# Patient Record
Sex: FEMALE | Race: BLACK OR AFRICAN AMERICAN | NOT HISPANIC OR LATINO | Employment: PART TIME | URBAN - METROPOLITAN AREA
[De-identification: names, ages, dates, MRNs, and addresses within clinical notes are randomized per-mention and may not be internally consistent; named-entity substitution may affect disease eponyms.]

---

## 2019-12-03 ENCOUNTER — HOSPITAL ENCOUNTER (EMERGENCY)
Facility: HOSPITAL | Age: 44
Discharge: HOME | End: 2019-12-03
Attending: EMERGENCY MEDICINE | Admitting: EMERGENCY MEDICINE
Payer: COMMERCIAL

## 2019-12-03 VITALS
SYSTOLIC BLOOD PRESSURE: 139 MMHG | DIASTOLIC BLOOD PRESSURE: 64 MMHG | HEART RATE: 89 BPM | RESPIRATION RATE: 17 BRPM | OXYGEN SATURATION: 100 % | TEMPERATURE: 97.5 F

## 2019-12-03 DIAGNOSIS — F32.A DEPRESSION: ICD-10-CM

## 2019-12-03 DIAGNOSIS — F41.9 ANXIETY: Primary | ICD-10-CM

## 2019-12-03 PROCEDURE — 99284 EMERGENCY DEPT VISIT MOD MDM: CPT | Performed by: EMERGENCY MEDICINE

## 2019-12-03 PROCEDURE — 99284 EMERGENCY DEPT VISIT MOD MDM: CPT

## 2019-12-03 RX ORDER — ALPRAZOLAM 0.25 MG/1
TABLET ORAL
Qty: 10 TABLET | Refills: 0 | Status: SHIPPED | OUTPATIENT
Start: 2019-12-03

## 2019-12-03 NOTE — ED PROVIDER NOTES
History  Chief Complaint   Patient presents with    Anxiety     pt states to feel down and out, c/o feeling anxious and "cant seem to come down from it" pt states to "have issues with significant other at home, causing it to be worse" pt denies SI/HI  pt does not currently take any medications for anxiety or depression  pt states "i have been drinking hot tea, taking hot baths to try to help"     HPI  58-year-old [place] American female with a chief complaint of feeling very anxious and does know how to deal with it any more  Patient states that she feels that she can no longer control it and tries to take a walk or go out in the yard and watch her kids play, drink hot tea, or even taking hot baths  Patient denies any prior history of depression or mental health problems  Patient does state that she has a sister who approximately 8 years ago was diagnosed with schizophrenia and refuses help and is now in a shelter  Patient works part-time as a  and has 3 young boys at home ages 10, 5 and Meekie  Patient has 2 older children out of the house - ages 23 and 32  Patient states approximately 2 years ago her  had an affair and she caught him cheating and their relationship has been on the rocks since that time  Patient states that he works in [place] and only comes home  and weekends but they are in separate rooms  Patient states that she has never gone to counseling or they have not gone to marital counseling together  Patient states  she confides a lot in her mother who is very supportive but lives in [place]  Patient states they moved to the [place] [address] approximately 2 years ago and she does not have any family here  Patient denies any suicidal or homicidal thoughts  None       History reviewed  No pertinent past medical history  Past Surgical History:   Procedure Laterality Date    ANKLE SURGERY Left      SECTION         History reviewed   No pertinent family history  I have reviewed and agree with the history as documented  Social History     Tobacco Use    Smoking status: Never Smoker    Smokeless tobacco: Never Used   Substance Use Topics    Alcohol use: Yes     Comment: occasional    Drug use: Never        Review of Systems   Constitutional: Negative for diaphoresis, fatigue and fever  HENT: Negative for congestion, ear pain, nosebleeds and sore throat  Eyes: Negative for photophobia, pain, discharge and visual disturbance  Respiratory: Negative for cough, choking, chest tightness, shortness of breath and wheezing  Cardiovascular: Negative for chest pain and palpitations  Gastrointestinal: Negative for abdominal distention, abdominal pain, diarrhea and vomiting  Genitourinary: Negative for dysuria, flank pain and frequency  Musculoskeletal: Negative for back pain, gait problem and joint swelling  Skin: Negative for color change and rash  Neurological: Negative for dizziness, syncope and headaches  Psychiatric/Behavioral: Positive for dysphoric mood  Negative for behavioral problems, confusion and suicidal ideas  The patient is nervous/anxious  All other systems reviewed and are negative  Physical Exam  Physical Exam   Constitutional: She is oriented to person, place, and time  She appears well-developed and well-nourished  HENT:   Head: Normocephalic and atraumatic  Mouth/Throat: Oropharynx is clear and moist    Eyes: Pupils are equal, round, and reactive to light  EOM are normal    Neck: Normal range of motion  Neck supple  Cardiovascular: Normal rate, regular rhythm and normal heart sounds  Pulmonary/Chest: Effort normal and breath sounds normal  No stridor  No respiratory distress  She has no wheezes  She has no rales  Abdominal: Soft  Bowel sounds are normal  There is no tenderness  There is no rebound and no guarding  Musculoskeletal: Normal range of motion     Neurological: She is alert and oriented to person, place, and time  No cranial nerve deficit  She exhibits normal muscle tone  Coordination normal    Skin: Skin is warm and dry  Psychiatric:   Tearful, anxious, and somewhat depressed  Patient denies any suicidal or homicidal thoughts  Patient has good eye contact and is able to smile and laugh at things  Patient does breakdown crying a few times however when talking about the sadness in her marriage or the estrangement  from her sister  Nursing note and vitals reviewed  Vital Signs  ED Triage Vitals [12/03/19 1143]   Temperature Pulse Respirations Blood Pressure SpO2   97 5 °F (36 4 °C) 89 17 139/64 100 %      Temp Source Heart Rate Source Patient Position - Orthostatic VS BP Location FiO2 (%)   Oral Monitor Sitting Left arm --      Pain Score       --           Vitals:    12/03/19 1143   BP: 139/64   Pulse: 89   Patient Position - Orthostatic VS: Sitting         Visual Acuity      ED Medications  Medications - No data to display    Diagnostic Studies  Results Reviewed     None                 No orders to display              Procedures  Procedures         ED Course      patient and I had a good long talk and she appreciated my help  I also consulted Ml Adan from crisis and she gave patient a packet of referrals  I encouraged patient to follow up with counselor as soon as possible and I also gave her a prescription for some Xanax but only 10 pills and warned her that they are very addicting and to only take them when she feels at her "wits end"  I also advised patient not to drive with this medication  patient states that she felt much better after she left here and everyone was so nice  MDM     Differential diagnosis includes:  1  Anxiety  2  Acute anxiety attack  3  Depression  4  Non homicidal depression  5   Nonsuicidal depression           Disposition  Final diagnoses:   Anxiety   Depression     Time reflects when diagnosis was documented in both MDM as applicable and the Disposition within this note     Time User Action Codes Description Comment    12/3/2019 12:53 PM Keny Vegas Add [F41 9] Anxiety     12/3/2019 12:53 PM Keny Vegas Add [F32 9] Depression       ED Disposition     ED Disposition Condition Date/Time Comment    Discharge Stable [place] Dec 3, 2019 12:53 PM Bartolome Tovar discharge to home/self care  Follow-up Information     Follow up With Specialties Details Why Contact Info    Counselor as referred to  In 1 week            Discharge Medication List as of 12/3/2019 12:55 PM      START taking these medications    Details   ALPRAZolam (XANAX) 0 25 mg tablet Take 1 tablet daily p r n  Anxiety, Print           No discharge procedures on file      ED Provider  Electronically Signed by           Layla Ibrahim DO  12/03/19 0902

## 2019-12-03 NOTE — ED NOTES
Patient provided outpt [facility] resources, per the request of the ED physician       Jaron Polanco LMSW  12/03/19  1276

## 2020-04-19 ENCOUNTER — HOSPITAL ENCOUNTER (EMERGENCY)
Facility: HOSPITAL | Age: 45
Discharge: HOME | End: 2020-04-19
Attending: EMERGENCY MEDICINE | Admitting: EMERGENCY MEDICINE
Payer: COMMERCIAL

## 2020-04-19 ENCOUNTER — APPOINTMENT (EMERGENCY)
Dept: RADIOLOGY | Facility: HOSPITAL | Age: 45
End: 2020-04-19
Payer: COMMERCIAL

## 2020-04-19 VITALS
TEMPERATURE: 97.5 F | DIASTOLIC BLOOD PRESSURE: 71 MMHG | HEART RATE: 93 BPM | RESPIRATION RATE: 19 BRPM | SYSTOLIC BLOOD PRESSURE: 146 MMHG | OXYGEN SATURATION: 98 %

## 2020-04-19 DIAGNOSIS — M25.532 LEFT WRIST PAIN: Primary | ICD-10-CM

## 2020-04-19 PROCEDURE — 99283 EMERGENCY DEPT VISIT LOW MDM: CPT

## 2020-04-19 PROCEDURE — 99282 EMERGENCY DEPT VISIT SF MDM: CPT | Performed by: EMERGENCY MEDICINE

## 2020-04-19 PROCEDURE — 73110 X-RAY EXAM OF WRIST: CPT

## 2020-04-19 PROCEDURE — 29125 APPL SHORT ARM SPLINT STATIC: CPT | Performed by: EMERGENCY MEDICINE

## 2020-04-19 RX ORDER — HYDROCODONE BITARTRATE AND ACETAMINOPHEN 5; 325 MG/1; MG/1
1 TABLET ORAL EVERY 6 HOURS PRN
Qty: 10 TABLET | Refills: 0 | Status: SHIPPED | OUTPATIENT
Start: 2020-04-19

## 2020-04-23 ENCOUNTER — OFFICE VISIT (OUTPATIENT)
Dept: OBGYN CLINIC | Facility: CLINIC | Age: 45
End: 2020-04-23

## 2020-04-23 VITALS
BODY MASS INDEX: 33.79 KG/M2 | WEIGHT: 236 LBS | SYSTOLIC BLOOD PRESSURE: 116 MMHG | HEIGHT: 70 IN | HEART RATE: 92 BPM | DIASTOLIC BLOOD PRESSURE: 79 MMHG

## 2020-04-23 DIAGNOSIS — M25.532 LEFT WRIST PAIN: Primary | ICD-10-CM

## 2020-04-23 PROCEDURE — 20605 DRAIN/INJ JOINT/BURSA W/O US: CPT | Performed by: ORTHOPAEDIC SURGERY

## 2020-04-23 PROCEDURE — 99203 OFFICE O/P NEW LOW 30 MIN: CPT | Performed by: ORTHOPAEDIC SURGERY

## 2020-04-23 RX ADMIN — LIDOCAINE HYDROCHLORIDE 2.5 ML: 10 INJECTION, SOLUTION INFILTRATION; PERINEURAL at 09:07

## 2020-04-23 RX ADMIN — Medication 0.5 MEQ: at 09:07

## 2020-04-23 RX ADMIN — TRIAMCINOLONE ACETONIDE 20 MG: 40 INJECTION, SUSPENSION INTRA-ARTICULAR; INTRAMUSCULAR at 09:07

## 2020-04-24 RX ORDER — LIDOCAINE HYDROCHLORIDE 10 MG/ML
2.5 INJECTION, SOLUTION INFILTRATION; PERINEURAL
Status: COMPLETED | OUTPATIENT
Start: 2020-04-23 | End: 2020-04-23

## 2020-04-24 RX ORDER — TRIAMCINOLONE ACETONIDE 40 MG/ML
20 INJECTION, SUSPENSION INTRA-ARTICULAR; INTRAMUSCULAR
Status: COMPLETED | OUTPATIENT
Start: 2020-04-23 | End: 2020-04-23

## 2021-02-03 ENCOUNTER — HOSPITAL ENCOUNTER (EMERGENCY)
Facility: HOSPITAL | Age: 46
Discharge: HOME | End: 2021-02-03
Attending: EMERGENCY MEDICINE | Admitting: EMERGENCY MEDICINE
Payer: COMMERCIAL

## 2021-02-03 VITALS
BODY MASS INDEX: 33.79 KG/M2 | TEMPERATURE: 98.4 F | RESPIRATION RATE: 17 BRPM | OXYGEN SATURATION: 100 % | HEIGHT: 70 IN | WEIGHT: 236 LBS | HEART RATE: 104 BPM | DIASTOLIC BLOOD PRESSURE: 97 MMHG | SYSTOLIC BLOOD PRESSURE: 150 MMHG

## 2021-02-03 DIAGNOSIS — N64.4 BREAST PAIN, LEFT: Primary | ICD-10-CM

## 2021-02-03 PROCEDURE — 99283 EMERGENCY DEPT VISIT LOW MDM: CPT

## 2021-02-03 PROCEDURE — 99284 EMERGENCY DEPT VISIT MOD MDM: CPT | Performed by: PHYSICIAN ASSISTANT

## 2021-02-03 NOTE — ED PROVIDER NOTES
History  Chief Complaint   Patient presents with    Breast Pain     Pt reports pain in her left breast  Denies any lump, but states she finished her period yesterday, but she has tenderness in breast itself  50year old female with no significant past medical history presents to ED with chief complaint of vague pain in left breast   Onset reports as 2-3 days ago  Location of symptoms reported as the left breast   Quality is reported as vague aching pain in left breast   Severity is reported as mild  Associated symptoms:  Denies any rash or skin changes to left breast, denies chest pain or sob  Denies cough  Denies fevers  Denies night sweats or weight loss  Denies lymph node swelling  Modifiers: patient reports that she just finished her period yesterday and thinks this may be contributing to symptoms  Context: patient also reports she recently moved and did a lot of lifting of boxes  Denies any recent fall or injury  Reports no past personal history of family history of breast cancer  No exogenous estrogen use  Patient is a non smoker  Patient reports that she is extremely anxious regarding this pain in her breast, largely concerned that is is breast cancer and because she has small children at home  She states she recently moved to [place] and has not been able to set up PCP yet  Old charts reviewed  Patient last seen in Ed on 4/19/20 for evaluation of wrist pain  History provided by:  Patient   used: No        Prior to Admission Medications   Prescriptions Last Dose Informant Patient Reported? Taking? ALPRAZolam (XANAX) 0 25 mg tablet  Self No No   Sig: Take 1 tablet daily p r n   Anxiety   Patient not taking: Reported on 4/23/2020   HYDROcodone-acetaminophen (NORCO) 5-325 mg per tablet  Self No No   Sig: Take 1 tablet by mouth every 6 (six) hours as needed for pain for up to 10 dosesMax Daily Amount: 4 tablets   Patient not taking: Reported on 2/5/2021 Facility-Administered Medications: None       History reviewed  No pertinent past medical history  Past Surgical History:   Procedure Laterality Date    ANKLE SURGERY Left      SECTION         Family History   Problem Relation Age of Onset    Diabetes Father     No Known Problems Mother     No Known Problems Sister     No Known Problems Brother     No Known Problems Sister      I have reviewed and agree with the history as documented  E-Cigarette/Vaping    E-Cigarette Use Never User      E-Cigarette/Vaping Substances    Nicotine No     THC No     CBD No     Flavoring No     Other No     Unknown No      Social History     Tobacco Use    Smoking status: Never Smoker    Smokeless tobacco: Never Used   Substance Use Topics    Alcohol use: Yes     Comment: occasional    Drug use: Never       Review of Systems   Constitutional: Negative for activity change, appetite change, chills, diaphoresis, fatigue, fever and unexpected weight change  HENT: Negative for congestion, dental problem, drooling, ear discharge, ear pain, facial swelling, hearing loss, mouth sores, nosebleeds, postnasal drip, rhinorrhea, sinus pressure, sinus pain, sneezing, sore throat, tinnitus, trouble swallowing and voice change  Eyes: Negative for photophobia, pain, discharge, redness, itching and visual disturbance  Respiratory: Negative for apnea, cough, choking, chest tightness, shortness of breath, wheezing and stridor  Cardiovascular: Negative for chest pain, palpitations and leg swelling  Gastrointestinal: Negative for abdominal distention, abdominal pain, anal bleeding, blood in stool, constipation, diarrhea, nausea, rectal pain and vomiting  Endocrine: Negative for cold intolerance, heat intolerance, polydipsia, polyphagia and polyuria     Genitourinary: Negative for decreased urine volume, difficulty urinating, dyspareunia, dysuria, enuresis, flank pain, frequency, hematuria, menstrual problem, pelvic pain, urgency, vaginal bleeding, vaginal discharge and vaginal pain  Musculoskeletal: Negative for arthralgias, back pain, gait problem, joint swelling, myalgias, neck pain and neck stiffness  Skin: Negative for color change, pallor, rash and wound  Allergic/Immunologic: Negative for environmental allergies, food allergies and immunocompromised state  Neurological: Negative for dizziness, tremors, seizures, syncope, facial asymmetry, speech difficulty, weakness, light-headedness, numbness and headaches  Hematological: Negative for adenopathy  Does not bruise/bleed easily  Psychiatric/Behavioral: Negative for agitation, confusion, hallucinations, self-injury and suicidal ideas  The patient is not hyperactive  All other systems reviewed and are negative  Physical Exam  Physical Exam  Vitals signs and nursing note reviewed  Constitutional:       General: She is not in acute distress  Appearance: Normal appearance  She is well-developed  She is not diaphoretic  Comments: /97 (BP Location: Left arm)   Pulse 104   Temp 98 4 °F (36 9 °C) (Oral)   Resp 17   Ht 5' 10" (1 778 m)   Wt 107 kg (236 lb)   SpO2 100%   BMI 33 86 kg/m²      HENT:      Head: Normocephalic and atraumatic  Right Ear: External ear normal       Left Ear: External ear normal       Nose: Nose normal  No congestion or rhinorrhea  Mouth/Throat:      Mouth: Mucous membranes are moist       Pharynx: No oropharyngeal exudate or posterior oropharyngeal erythema  Eyes:      General: No scleral icterus  Right eye: No discharge  Left eye: No discharge  Conjunctiva/sclera: Conjunctivae normal       Pupils: Pupils are equal, round, and reactive to light  Neck:      Musculoskeletal: Normal range of motion and neck supple  No neck rigidity or muscular tenderness  Thyroid: No thyromegaly  Vascular: No JVD  Trachea: No tracheal deviation     Cardiovascular:      Rate and Rhythm: Normal rate and regular rhythm  Pulses: Normal pulses  Pulmonary:      Effort: Pulmonary effort is normal  No respiratory distress  Breath sounds: Normal breath sounds  No stridor  No wheezing, rhonchi or rales  Comments: Breast exam:  Left breast   Exam chaperoned by A  QUEENIE Aguayo  Exam performed by A  QUEENIE Aguayo  Normal inspection left breast   No skin thickening, erythema, or inflammatory changes present  No nipple inversion or discharge  Fibrocystic changes noted on palpation through entire breast  Mild area of fullness, poorly localized, just inferior to areola on left  Fibrous changes noted in this area  No well formed or identified masses  No left axillary lymphadenopathy or tenderness  Chest:      Chest wall: No tenderness  Abdominal:      General: Bowel sounds are normal  There is no distension  Palpations: Abdomen is soft  There is no mass  Tenderness: There is no abdominal tenderness  There is no right CVA tenderness, left CVA tenderness, guarding or rebound  Hernia: No hernia is present  Musculoskeletal: Normal range of motion  General: No swelling, tenderness, deformity or signs of injury  Right lower leg: No edema  Left lower leg: No edema  Lymphadenopathy:      Cervical: No cervical adenopathy  Skin:     General: Skin is dry  Capillary Refill: Capillary refill takes less than 2 seconds  Coloration: Skin is not jaundiced or pale  Findings: No bruising, erythema, lesion or rash  Neurological:      General: No focal deficit present  Mental Status: She is alert and oriented to person, place, and time  Mental status is at baseline  Cranial Nerves: No cranial nerve deficit  Sensory: No sensory deficit  Motor: No weakness or abnormal muscle tone        Coordination: Coordination normal       Gait: Gait normal       Deep Tendon Reflexes: Reflexes normal    Psychiatric:         Mood and Affect: Mood normal          Behavior: Behavior normal          Thought Content: Thought content normal          Judgment: Judgment normal          Vital Signs  ED Triage Vitals [02/03/21 0938]   Temperature Pulse Respirations Blood Pressure SpO2   98 4 °F (36 9 °C) 104 17 150/97 100 %      Temp Source Heart Rate Source Patient Position - Orthostatic VS BP Location FiO2 (%)   Oral Monitor Sitting Left arm --      Pain Score       6           Vitals:    02/03/21 0938   BP: 150/97   Pulse: 104   Patient Position - Orthostatic VS: Sitting         Visual Acuity      ED Medications  Medications - No data to display    Diagnostic Studies  Results Reviewed     None                 No orders to display              Procedures  Procedures         ED Course                             SBIRT 22yo+      Most Recent Value   SBIRT (24 yo +)   In order to provide better care to our patients, we are screening all of our patients for alcohol and drug use  Would it be okay to ask you these screening questions? Yes Filed at: 02/03/2021 1116   Initial Alcohol Screen: US AUDIT-C    1  How often do you have a drink containing alcohol?  0 Filed at: 02/03/2021 1116   2  How many drinks containing alcohol do you have on a typical day you are drinking? 0 Filed at: 02/03/2021 1116   3a  Male UNDER 65: How often do you have five or more drinks on one occasion? 0 Filed at: 02/03/2021 1116   3b  FEMALE Any Age, or MALE 65+: How often do you have 4 or more drinks on one occassion? 0 Filed at: 02/03/2021 1116   Audit-C Score  0 Filed at: 02/03/2021 1116   ANNELISE: How many times in the past year have you    Used an illegal drug or used a prescription medication for non-medical reasons? Never Filed at: 02/03/2021 1116                    MDM  Number of Diagnoses or Management Options  Breast pain, left:   Diagnosis management comments: ddx includes but is not limited to breast cancer, fibrocystic breast disease, breast mass, mastitis          Amount and/or Complexity of Data Reviewed  Review and summarize past medical records: yes    Risk of Complications, Morbidity, and/or Mortality  General comments: Long discussion with patient  Explained to patient that while exam appears most consistent with fibrocystic breast disease, physical exam cannot replaced mammography for further evaluation of breast mass or pain and evaluation for possible breast cancer  No overlying skin changes of concern and no evidence of mastitis  Discussed with patient gold standard testing for her concern of breath cancer is mammography  Instructed patient to follow up with ob/gyn or breast surgeon as soon as possible for further evaluation and mammography  Discussed she should not be falsely reassured by her physical exam today she needs definitive imaging for complete evaluation of breast mass  Patient verbalized that she will absolutely follow up to get mammogram   Outpatient resources given  Instructed to follow up with pcp and ob/gyn for further evaluation of her symptoms  Discussed may use APAP or NSAIDs OTC for treatment of pain  Entire exam and encounter chaperoned by A  QUEENIE Aguayo  Reviewed reasons to return to ed  Patient verbalized understanding of diagnosis and agreement with discharge plan of care as well as understanding of reasons to return to ed        Patient was seen during the outbreak of the corona virus epidemic   Resources are limited due to the severity of patient illnesses associated with virus   Testing is also limited at this time   Discussed with patient at the time of this evaluation   Due to the fact that limited resources are available -treatment options are limited        Patient Progress  Patient progress: stable      Disposition  Final diagnoses:   Breast pain, left     Time reflects when diagnosis was documented in both MDM as applicable and the Disposition within this note     Time User Action Codes Description Comment    2/3/2021 10:48 AM Sonny Hsieh Add [N64 4] Breast pain, left       ED Disposition     ED Disposition Condition Date/Time Comment    Discharge Stable Wed Feb 3, 2021 11:07 AM Bartolome Tovar discharge to home/self care  Follow-up Information     Follow up With Specialties Details Why Contact Info Additional [address] [facility] Emergency Department Emergency Medicine Go to  If symptoms worsen [address] [id number] [facility] Emergency Department, [place], [place], [address]    Josette Murcia MD Oncology, Surgical Oncology, Breast Surgery Call in 1 day for further evaluation of symptoms [address] [place] [address]  [phone]       Suzy Romo MD Family Medicine Call in 2 days for further evaluation of symptoms [address] [facility],[address]  [place] [address]  [phone]             Discharge Medication List as of 2/3/2021 11:07 AM      CONTINUE these medications which have NOT CHANGED    Details   ALPRAZolam (XANAX) 0 25 mg tablet Take 1 tablet daily p r n  Anxiety, Print      HYDROcodone-acetaminophen (NORCO) 5-325 mg per tablet Take 1 tablet by mouth every 6 (six) hours as needed for pain for up to 10 dosesMax Daily Amount: 4 tablets, Starting Sun 4/19/2020, Print           No discharge procedures on file      PDMP Review     None          ED Provider  Electronically Signed by           Shereen Be PA-C  02/06/21 2039

## 2021-02-05 ENCOUNTER — OFFICE VISIT (OUTPATIENT)
Dept: OBGYN CLINIC | Facility: MEDICAL CENTER | Age: 46
End: 2021-02-05
Payer: COMMERCIAL

## 2021-02-05 VITALS
HEIGHT: 69 IN | DIASTOLIC BLOOD PRESSURE: 72 MMHG | WEIGHT: 235.2 LBS | SYSTOLIC BLOOD PRESSURE: 132 MMHG | BODY MASS INDEX: 34.83 KG/M2

## 2021-02-05 DIAGNOSIS — N63.0 BREAST NODULE: Primary | ICD-10-CM

## 2021-02-05 PROCEDURE — 99203 OFFICE O/P NEW LOW 30 MIN: CPT | Performed by: NURSE PRACTITIONER

## 2021-02-05 RX ORDER — CHOLECALCIFEROL (VITAMIN D3) 125 MCG
CAPSULE ORAL
COMMUNITY

## 2021-02-05 NOTE — PROGRESS NOTES
Assessment/Plan:  Monthly breast self exam  Mammogram and breast ultrasound ordered  Will call results  Return to office for annual exam       Diagnoses and all orders for this visit:    Breast nodule  -     Mammo diagnostic bilateral w 3d & cad; Future  -     US breast left limited (diagnostic); Future    Other orders  -     Melatonin 5 MG TABS; Take by mouth          Subjective:      Patient ID: Kiley Gonzales is a 50 y o  female  Kiley Gonzales is a 50 y o  female who is here today as a new patient for a problem visit   She was evaluated in the ER yesterday for left breast pain and  left breast "hardness "  Breast pain started 4 days ago  Describes the pain as discomfort or an ache as if something bumped into her  Denies breast trauma but admits to moving large furniture 5 days ago  Her last mammogram was 4 years ago and was normal  No new breast lump and no nipple discharge  Monthly menses x 5 days with mod flow  Menses is acceptable  LMP 1/25/2021  Kiley Gonzales is sexually active but is currently  from her  of 11 years  Tearful about her separation  Last gyn exam was 5 years ago  The following portions of the patient's history were reviewed and updated as appropriate: allergies, current medications, past family history, past medical history, past social history, past surgical history and problem list     Review of Systems   Constitutional: Negative  Eyes: Negative for visual disturbance  Respiratory: Negative for chest tightness and shortness of breath  Cardiovascular: Negative for chest pain, palpitations and leg swelling  Gastrointestinal: Negative for abdominal pain, constipation, diarrhea, nausea and vomiting  Genitourinary: Negative for dysuria, menstrual problem, vaginal bleeding and vaginal discharge  Musculoskeletal: Negative for back pain  Skin: Negative  Neurological: Negative for weakness, light-headedness and headaches  Psychiatric/Behavioral: Negative  All other systems reviewed and are negative  Objective:      /72 (BP Location: Right arm, Patient Position: Sitting, Cuff Size: Large)   Ht 5' 9" (1 753 m)   Wt 107 kg (235 lb 3 2 oz)   BMI 34 73 kg/m²          Physical Exam  Nursing note reviewed  Constitutional:       Appearance: She is well-developed  She is obese  Neck:      Musculoskeletal: Normal range of motion  Chest:      Breasts:         Right: Normal          Left: Mass (1 cm FN 6:00 position nodule approx 1-2 cm in size, non movable) present  No swelling, bleeding, inverted nipple, nipple discharge, skin change or tenderness  Lymphadenopathy:      Cervical: No cervical adenopathy  Upper Body:      Right upper body: No supraclavicular or axillary adenopathy  Left upper body: No supraclavicular or axillary adenopathy  Neurological:      Mental Status: She is alert and oriented to person, place, and time

## 2021-02-05 NOTE — PATIENT INSTRUCTIONS
Monthly breast self exam  Mammogram and breast ultrasound ordered  Will call results    Return to office for annual exam

## 2021-02-09 ENCOUNTER — HOSPITAL ENCOUNTER (OUTPATIENT)
Dept: MAMMOGRAPHY | Facility: CLINIC | Age: 46
Discharge: HOME | End: 2021-02-09
Payer: COMMERCIAL

## 2021-02-09 ENCOUNTER — HOSPITAL ENCOUNTER (OUTPATIENT)
Dept: ULTRASOUND IMAGING | Facility: CLINIC | Age: 46
Discharge: HOME | End: 2021-02-09
Payer: COMMERCIAL

## 2021-02-09 VITALS — WEIGHT: 235 LBS | BODY MASS INDEX: 33.64 KG/M2 | HEIGHT: 70 IN

## 2021-02-09 DIAGNOSIS — N63.0 BREAST NODULE: ICD-10-CM

## 2021-02-09 PROCEDURE — G0279 TOMOSYNTHESIS, MAMMO: HCPCS

## 2021-02-09 PROCEDURE — 76642 ULTRASOUND BREAST LIMITED: CPT

## 2021-02-09 PROCEDURE — 77066 DX MAMMO INCL CAD BI: CPT

## 2021-03-05 ENCOUNTER — ANNUAL EXAM (OUTPATIENT)
Dept: OBGYN CLINIC | Facility: MEDICAL CENTER | Age: 46
End: 2021-03-05
Payer: COMMERCIAL

## 2021-03-05 VITALS
DIASTOLIC BLOOD PRESSURE: 72 MMHG | HEIGHT: 69 IN | WEIGHT: 240.2 LBS | SYSTOLIC BLOOD PRESSURE: 122 MMHG | BODY MASS INDEX: 35.58 KG/M2

## 2021-03-05 DIAGNOSIS — Z01.419 ENCNTR FOR GYN EXAM (GENERAL) (ROUTINE) W/O ABN FINDINGS: Primary | ICD-10-CM

## 2021-03-05 DIAGNOSIS — Z12.4 CERVICAL CANCER SCREENING: ICD-10-CM

## 2021-03-05 DIAGNOSIS — Z12.31 ENCOUNTER FOR SCREENING MAMMOGRAM FOR MALIGNANT NEOPLASM OF BREAST: ICD-10-CM

## 2021-03-05 DIAGNOSIS — Z11.51 SCREENING FOR HUMAN PAPILLOMAVIRUS (HPV): ICD-10-CM

## 2021-03-05 PROCEDURE — G0145 SCR C/V CYTO,THINLAYER,RESCR: HCPCS | Performed by: NURSE PRACTITIONER

## 2021-03-05 PROCEDURE — S0612 ANNUAL GYNECOLOGICAL EXAMINA: HCPCS | Performed by: NURSE PRACTITIONER

## 2021-03-05 PROCEDURE — 87624 HPV HI-RISK TYP POOLED RSLT: CPT | Performed by: NURSE PRACTITIONER

## 2021-03-05 NOTE — PATIENT INSTRUCTIONS
Calcium 1000 mg + 600-1000 IU Vit D daily  Pap with high risk HPV Q 5 years-done  Annual mammogram (due 1 year), monthly breast self exam   Exercise 150 minutes per week minimum  Weight loss encouraged  Kegels 20 times twice daily

## 2021-03-05 NOTE — PROGRESS NOTES
Assessment/Plan:    Calcium 1000 mg + 600-1000 IU Vit D daily  Pap with high risk HPV Q 5 years-done  Annual mammogram (due 1 year), monthly breast self exam   Exercise 150 minutes per week minimum  Weight loss encouraged  Kegels 20 times twice daily  Diagnoses and all orders for this visit:    Encntr for gyn exam (general) (routine) w/o abn findings    Cervical cancer screening  -     Liquid-based pap, screening    Screening for human papillomavirus (HPV)  -     Liquid-based pap, screening    Encounter for screening mammogram for malignant neoplasm of breast  -     Mammo screening bilateral w 3d & cad; Future    BMI 35 0-35 9,adult          Subjective:      Patient ID: Margarita Powell is a 50 y o  female  Margarita Powell is a 50 y o  female who is here today for her annual visit  Last evaluated in this office as a new patient on 2/5/21 for left breast pain and breast hardness  Diagnostic mammo and breast US showed:  "1 1 cm focal area of ductal dilatation is noted in the 6 o'clock position of the left breast, retroareolar region  This correlates with the circumscribed mass seen on mammography " No evidence of malignancy noted  No health concerns  Monthly menses x 5 days with mod flow  Menses is acceptable  LMP 1/25/2021  She is cramping today as if she may get her menses  History of tubal ligation  Margarita Powell is sexually active but is currently  from her  of 11 years  Tearful about her separation  Last gyn exam was 5 years ago  The following portions of the patient's history were reviewed and updated as appropriate: allergies, current medications, past family history, past medical history, past social history, past surgical history and problem list     Review of Systems   Constitutional: Negative  Negative for activity change, appetite change, chills, diaphoresis, fatigue, fever and unexpected weight change     HENT: Negative for congestion, dental problem, sneezing, sore throat and trouble swallowing  Eyes: Negative for visual disturbance  Respiratory: Negative for chest tightness and shortness of breath  Cardiovascular: Negative for chest pain and leg swelling  Gastrointestinal: Negative for abdominal pain, constipation, diarrhea, nausea and vomiting  Genitourinary: Negative for difficulty urinating, dysuria, frequency, hematuria, menstrual problem, pelvic pain, urgency, vaginal bleeding, vaginal discharge and vaginal pain  Musculoskeletal: Negative for back pain and neck pain  Skin: Negative  Allergic/Immunologic: Negative  Neurological: Negative for weakness and headaches  Hematological: Negative for adenopathy  Psychiatric/Behavioral: Negative  Objective:      /72 (BP Location: Left arm, Patient Position: Sitting, Cuff Size: Large)   Ht 5' 9" (1 753 m)   Wt 109 kg (240 lb 3 2 oz)   BMI 35 47 kg/m²          Physical Exam  Vitals signs and nursing note reviewed  Constitutional:       Appearance: Normal appearance  She is well-developed  She is obese  Comments: Physical exam limited by habitus   HENT:      Head: Normocephalic and atraumatic  Eyes:      General:         Right eye: No discharge  Left eye: No discharge  Neck:      Musculoskeletal: Normal range of motion and neck supple  Thyroid: No thyromegaly  Trachea: Trachea normal    Cardiovascular:      Rate and Rhythm: Normal rate and regular rhythm  Heart sounds: Normal heart sounds  Pulmonary:      Effort: Pulmonary effort is normal       Breath sounds: Normal breath sounds  Chest:      Breasts: Breasts are symmetrical          Right: Normal  No inverted nipple, mass, nipple discharge, skin change or tenderness  Left: Normal  No inverted nipple, mass, nipple discharge, skin change or tenderness  Comments: Prior left breast mass resolved  Abdominal:      Palpations: Abdomen is soft  Genitourinary:     General: Normal vulva        Exam position: Lithotomy position  Labia:         Right: No rash, tenderness, lesion or injury  Left: No rash, tenderness, lesion or injury  Urethra: No prolapse, urethral pain, urethral swelling or urethral lesion  Vagina: Normal  No signs of injury and foreign body  No vaginal discharge, erythema, tenderness or bleeding  Cervix: Normal       Uterus: Normal        Adnexa:         Right: No mass, tenderness or fullness  Left: No mass, tenderness or fullness  Rectum: No external hemorrhoid  Comments: Vaginal tone 4/5  Musculoskeletal: Normal range of motion  Lymphadenopathy:      Head:      Right side of head: No submental, submandibular or tonsillar adenopathy  Left side of head: No submental, submandibular or tonsillar adenopathy  Cervical: No cervical adenopathy  Upper Body:      Right upper body: No supraclavicular or axillary adenopathy  Left upper body: No supraclavicular or axillary adenopathy  Lower Body: No right inguinal adenopathy  No left inguinal adenopathy  Skin:     General: Skin is warm and dry  Neurological:      Mental Status: She is alert and oriented to person, place, and time  Psychiatric:         Mood and Affect: Mood normal          Behavior: Behavior normal          Thought Content:  Thought content normal          Judgment: Judgment normal

## 2021-03-09 LAB
HPV HR 12 DNA CVX QL NAA+PROBE: NEGATIVE
HPV16 DNA CVX QL NAA+PROBE: NEGATIVE
HPV18 DNA CVX QL NAA+PROBE: NEGATIVE

## 2021-03-12 LAB
LAB AP GYN PRIMARY INTERPRETATION: NORMAL
Lab: NORMAL
PATH INTERP SPEC-IMP: NORMAL

## 2021-03-15 ENCOUNTER — TELEPHONE (OUTPATIENT)
Dept: OBGYN CLINIC | Facility: CLINIC | Age: 46
End: 2021-03-15

## 2021-03-15 NOTE — TELEPHONE ENCOUNTER
I called and spoke to pt informing of below  I did ask pt if any symptoms of BV but she does not  I told pt if anything were to change to call us  She verbalized understanding

## 2021-03-15 NOTE — TELEPHONE ENCOUNTER
----- Message from Bella Cha, [address] M[address] sent at 3/14/2021  7:44 PM EDT -----  Normal pap and negative high risk HPV  No abnormal vaginal discharge or complaints at time of visit

## 2022-04-11 ENCOUNTER — HOSPITAL ENCOUNTER (EMERGENCY)
Facility: HOSPITAL | Age: 47
Discharge: HOME | End: 2022-04-12
Attending: EMERGENCY MEDICINE | Admitting: EMERGENCY MEDICINE
Payer: COMMERCIAL

## 2022-04-11 ENCOUNTER — APPOINTMENT (EMERGENCY)
Dept: RADIOLOGY | Facility: HOSPITAL | Age: 47
End: 2022-04-11

## 2022-04-11 VITALS
TEMPERATURE: 98.2 F | HEART RATE: 80 BPM | DIASTOLIC BLOOD PRESSURE: 79 MMHG | WEIGHT: 248.24 LBS | SYSTOLIC BLOOD PRESSURE: 149 MMHG | BODY MASS INDEX: 35.54 KG/M2 | HEIGHT: 70 IN | OXYGEN SATURATION: 100 % | RESPIRATION RATE: 18 BRPM

## 2022-04-11 DIAGNOSIS — M25.561 RIGHT KNEE PAIN: Primary | ICD-10-CM

## 2022-04-11 PROCEDURE — 96372 THER/PROPH/DIAG INJ SC/IM: CPT

## 2022-04-11 PROCEDURE — 99283 EMERGENCY DEPT VISIT LOW MDM: CPT

## 2022-04-11 PROCEDURE — 73564 X-RAY EXAM KNEE 4 OR MORE: CPT

## 2022-04-11 RX ORDER — KETOROLAC TROMETHAMINE 30 MG/ML
15 INJECTION, SOLUTION INTRAMUSCULAR; INTRAVENOUS ONCE
Status: COMPLETED | OUTPATIENT
Start: 2022-04-11 | End: 2022-04-11

## 2022-04-11 RX ADMIN — KETOROLAC TROMETHAMINE 15 MG: 30 INJECTION, SOLUTION INTRAMUSCULAR at 22:52

## 2022-04-12 PROCEDURE — 99284 EMERGENCY DEPT VISIT MOD MDM: CPT | Performed by: EMERGENCY MEDICINE

## 2022-04-12 RX ORDER — IBUPROFEN 800 MG/1
800 TABLET ORAL 3 TIMES DAILY
Qty: 21 TABLET | Refills: 0 | Status: SHIPPED | OUTPATIENT
Start: 2022-04-12

## 2022-04-12 NOTE — ED PROVIDER NOTES
History  Chief Complaint   Patient presents with    Leg Pain     Per Pt " I've been pain in my R knee for over a week "      64-year-old female presenting to the emergency department for evaluation of leg pain  Patient has had progressive atraumatic the right knee over week  There is mild tenderness over the lateral aspect of the knee joint as well as a mild-to-moderate effusion  No erythema, no redness or warmth  Prior to Admission Medications   Prescriptions Last Dose Informant Patient Reported? Taking? ALPRAZolam (XANAX) 0 25 mg tablet  Self No No   Sig: Take 1 tablet daily p r n  Anxiety   Patient not taking: Reported on 2020   HYDROcodone-acetaminophen (NORCO) 5-325 mg per tablet  Self No No   Sig: Take 1 tablet by mouth every 6 (six) hours as needed for pain for up to 10 dosesMax Daily Amount: 4 tablets   Patient not taking: Reported on 2021   Melatonin 5 MG TABS  Self Yes No   Sig: Take by mouth      Facility-Administered Medications: None       History reviewed  No pertinent past medical history  Past Surgical History:   Procedure Laterality Date    ANKLE SURGERY Left      SECTION      TUBAL LIGATION         Family History   Problem Relation Age of Onset    Diabetes Father     No Known Problems Mother     No Known Problems Sister     No Known Problems Brother     No Known Problems Paternal Grandmother     No Known Problems Sister     No Known Problems Maternal Aunt     No Known Problems Maternal Aunt     No Known Problems Maternal Aunt     No Known Problems Paternal Aunt     Breast cancer Neg Hx      I have reviewed and agree with the history as documented      E-Cigarette/Vaping    E-Cigarette Use Never User      E-Cigarette/Vaping Substances    Nicotine No     THC No     CBD No     Flavoring No     Other No     Unknown No      Social History     Tobacco Use    Smoking status: Never Smoker    Smokeless tobacco: Never Used   Vaping Use    Vaping Use: Never used   Substance Use Topics    Alcohol use: Yes     Comment: occasional    Drug use: Never       Review of Systems   Constitutional: Negative for appetite change, chills, fatigue and fever  HENT: Negative for sneezing and sore throat  Eyes: Negative for visual disturbance  Respiratory: Negative for cough, choking, chest tightness, shortness of breath and wheezing  Cardiovascular: Negative for chest pain and palpitations  Gastrointestinal: Negative for abdominal pain, constipation, diarrhea, nausea and vomiting  Genitourinary: Negative for difficulty urinating and dysuria  Musculoskeletal: Positive for arthralgias  Neurological: Negative for dizziness, weakness, light-headedness, numbness and headaches  All other systems reviewed and are negative  Physical Exam  Physical Exam  Vitals and nursing note reviewed  Constitutional:       General: She is not in acute distress  Appearance: She is well-developed  She is not diaphoretic  HENT:      Head: Normocephalic and atraumatic  Eyes:      Pupils: Pupils are equal, round, and reactive to light  Neck:      Vascular: No JVD  Trachea: No tracheal deviation  Cardiovascular:      Rate and Rhythm: Normal rate and regular rhythm  Heart sounds: Normal heart sounds  No murmur heard  No friction rub  No gallop  Pulmonary:      Effort: Pulmonary effort is normal  No respiratory distress  Breath sounds: Normal breath sounds  No wheezing or rales  Abdominal:      General: Bowel sounds are normal  There is no distension  Palpations: Abdomen is soft  Tenderness: There is no abdominal tenderness  There is no guarding or rebound  Musculoskeletal:      Comments: Mild tenderness the lateral right joint  There is also mild effusion  Full range of motion  Neurovascularly intact distally  Skin:     General: Skin is warm and dry  Coloration: Skin is not pale     Neurological:      Mental Status: She is alert and oriented to person, place, and time  Cranial Nerves: No cranial nerve deficit  Motor: No abnormal muscle tone  Psychiatric:         Behavior: Behavior normal          Vital Signs  ED Triage Vitals [04/11/22 2203]   Temperature Pulse Respirations Blood Pressure SpO2   98 2 °F (36 8 °C) 80 18 149/79 100 %      Temp Source Heart Rate Source Patient Position - Orthostatic VS BP Location FiO2 (%)   Temporal Monitor Sitting Left arm --      Pain Score       --           Vitals:    04/11/22 2203   BP: 149/79   Pulse: 80   Patient Position - Orthostatic VS: Sitting         Visual Acuity      ED Medications  Medications   ketorolac (TORADOL) injection 15 mg (15 mg Intramuscular Given 4/11/22 2252)       Diagnostic Studies  Results Reviewed     None                 XR knee 4+ vw right injury    (Results Pending)              Procedures  Procedures         ED Course                               SBIRT 22yo+      Most Recent Value   SBIRT (24 yo +)    In order to provide better care to our patients, we are screening all of our patients for alcohol and drug use  Would it be okay to ask you these screening questions? Yes Filed at: 04/11/2022 2217   Initial Alcohol Screen: US AUDIT-C     1  How often do you have a drink containing alcohol? 0 Filed at: 04/11/2022 2217   2  How many drinks containing alcohol do you have on a typical day you are drinking? 0 Filed at: 04/11/2022 2217   3a  Male UNDER 65: How often do you have five or more drinks on one occasion? 0 Filed at: 04/11/2022 2217   3b  FEMALE Any Age, or MALE 65+: How often do you have 4 or more drinks on one occassion? 0 Filed at: 04/11/2022 2217   Audit-C Score 0 Filed at: 04/11/2022 2217   ANNELISE: How many times in the past year have you    Used an illegal drug or used a prescription medication for non-medical reasons?  Never Filed at: 04/11/2022 2217                    MDM  Number of Diagnoses or Management Options  Right knee pain  Diagnosis management comments: 75-year-old female with atraumatic right knee pain, will check x-ray, give NSAIDs, refer to ortho  Disposition  Final diagnoses:   Right knee pain     Time reflects when diagnosis was documented in both MDM as applicable and the Disposition within this note     Time User Action Codes Description Comment    4/12/2022 12:45 AM Radha Plaza [M25 [id number]] Right knee pain       ED Disposition     ED Disposition Condition Date/Time Comment    Discharge Stable Tue Apr 12, 2022 12:45 AM [place] Aura discharge to home/self care  Follow-up Information     Follow up With Specialties Details Why Contact Info Additional Information     [address] [facility] Orthopedic Surgery   [address] [facility] [address] [facility] [id number]  [address] [place] [address] Specialists [facility], [address] [address] [facility], [address] [place] [address], [address] [facility]          Discharge Medication List as of 4/12/2022 12:50 AM      START taking these medications    Details   ibuprofen (MOTRIN) 800 mg tablet Take 1 tablet (800 mg total) by mouth 3 (three) times a day, Starting Tue 4/12/2022, Normal         CONTINUE these medications which have NOT CHANGED    Details   ALPRAZolam (XANAX) 0 25 mg tablet Take 1 tablet daily p r n  Anxiety, Print      HYDROcodone-acetaminophen (NORCO) 5-325 mg per tablet Take 1 tablet by mouth every 6 (six) hours as needed for pain for up to 10 dosesMax Daily Amount: 4 tablets, Starting Sun 4/19/2020, Print      Melatonin 5 MG TABS Take by mouth, Historical Med             No discharge procedures on file      PDMP Review     None          ED Provider  Electronically Signed by           Adriana Marie MD  04/12/22 3152

## 2022-09-19 ENCOUNTER — HOSPITAL ENCOUNTER (EMERGENCY)
Facility: HOSPITAL | Age: 47
Discharge: HOME | End: 2022-09-19
Attending: EMERGENCY MEDICINE

## 2022-09-19 VITALS
SYSTOLIC BLOOD PRESSURE: 150 MMHG | DIASTOLIC BLOOD PRESSURE: 77 MMHG | RESPIRATION RATE: 18 BRPM | HEART RATE: 79 BPM | OXYGEN SATURATION: 100 % | TEMPERATURE: 98.6 F

## 2022-09-19 DIAGNOSIS — R05.9 COUGH: Primary | ICD-10-CM

## 2022-09-19 LAB — SARS-COV-2 RNA RESP QL NAA+PROBE: NEGATIVE

## 2022-09-19 PROCEDURE — 99284 EMERGENCY DEPT VISIT MOD MDM: CPT | Performed by: EMERGENCY MEDICINE

## 2022-09-19 PROCEDURE — U0005 INFEC AGEN DETEC AMPLI PROBE: HCPCS | Performed by: EMERGENCY MEDICINE

## 2022-09-19 PROCEDURE — 99283 EMERGENCY DEPT VISIT LOW MDM: CPT

## 2022-09-19 PROCEDURE — U0003 INFECTIOUS AGENT DETECTION BY NUCLEIC ACID (DNA OR RNA); SEVERE ACUTE RESPIRATORY SYNDROME CORONAVIRUS 2 (SARS-COV-2) (CORONAVIRUS DISEASE [COVID-19]), AMPLIFIED PROBE TECHNIQUE, MAKING USE OF HIGH THROUGHPUT TECHNOLOGIES AS DESCRIBED BY CMS-2020-01-R: HCPCS | Performed by: EMERGENCY MEDICINE

## 2022-09-19 RX ORDER — ALBUTEROL SULFATE 90 UG/1
2 AEROSOL, METERED RESPIRATORY (INHALATION) ONCE
Status: COMPLETED | OUTPATIENT
Start: 2022-09-19 | End: 2022-09-19

## 2022-09-19 RX ADMIN — ALBUTEROL SULFATE 2 PUFF: 90 AEROSOL, METERED RESPIRATORY (INHALATION) at 21:26

## 2022-09-20 NOTE — ED PROVIDER NOTES
History  Chief Complaint   Patient presents with    Cough     Pt c/o of  non productive cough starting last Thursday  Pt states son was sick last week  Denies HA, dizziness and SOB  History of Present Illness   50 y o  female presenting for evaluation of one week of cough  Patient states the cough was tight but she has been taking Robitussin and now it is loose       Patient denies any fever or chills  Patient denies any pharyngitis, congestion, sinus pain, or headache  Patient denies any chest pain or dyspnea  Patient states she did have diarrhea today after using Mucinex for the 1st time  Patient denies any nausea or vomiting  Patient has been eating and tolerating oral intake without difficulty  Patient denies any history of immunocompromised state or any history of respiratory disease  Patient does note she felt as though she was wheezing the other day but no episodes today and she does not have wheezing on clinical examination  Patient notes her child was ill last week though subsequently improved  Patient is not vaccinated against COVID-19  Medical Decision Making   The patient presents with multiple symptoms with a broad differential but most consistent with acute viral upper respiratory tract infection, possibly COVID-19 though fortunately patient's pulse oximetry is normal and patient appears clinically well  Patient does not present demonstrate any examination findings or history consistent with lower respiratory tract infection, thus it is appropriate to defer CXR and labwork at this time  No evidence of bacterial infections including pneumonia, meningitis, or pharyngitis  Will send COVID-19 testing  Patient declined to wait for the results  Discussed she is at high risk for complications considering her unvaccinated state  Discussed symptomatic care in detail with the patient, including buckwheat honey and lozenges    Discussed limited pharmaco benefit regarding treatments for cough  Patient states she was wheezing earlier in the course, will provide albuterol inhaler though I discussed if this is beneficial, she should see primary care for pulmonary function testing  Advised to continue ibuprofen and acetaminophen  Patient is to followup with primary care physician with continuing symptoms in the next 3-5 days  Patient advised to return to the ER with change or worsening of symptoms, including change in mental status, uncontrolled fever, inability to tolerate liquids, dehydration, respiratory distress or other concerns  Cough  Cough characteristics: see HPI  Sputum characteristics:  Nondescript  Severity:  Moderate  Onset quality:  Sudden  Timing:  Constant  Progression:  Waxing and waning  Chronicity:  New  Context: sick contacts    Relieved by:  Nothing  Worsened by:  Nothing  Ineffective treatments:  Cough suppressants  Associated symptoms: wheezing    Associated symptoms: no chest pain, no chills, no diaphoresis, no ear fullness, no ear pain, no eye discharge, no fever, no headaches, no myalgias, no rash, no rhinorrhea, no shortness of breath, no sinus congestion, no sore throat and no weight loss        Prior to Admission Medications   Prescriptions Last Dose Informant Patient Reported? Taking? ALPRAZolam (XANAX) 0 25 mg tablet  Self No No   Sig: Take 1 tablet daily p r n  Anxiety   Patient not taking: Reported on 4/23/2020   HYDROcodone-acetaminophen (NORCO) 5-325 mg per tablet  Self No No   Sig: Take 1 tablet by mouth every 6 (six) hours as needed for pain for up to 10 dosesMax Daily Amount: 4 tablets   Patient not taking: Reported on 2/5/2021   Melatonin 5 MG TABS  Self Yes No   Sig: Take by mouth   ibuprofen (MOTRIN) 800 mg tablet   No No   Sig: Take 1 tablet (800 mg total) by mouth 3 (three) times a day      Facility-Administered Medications: None       History reviewed  No pertinent past medical history      Past Surgical History:   Procedure Laterality Date    ANKLE SURGERY Left      SECTION      TUBAL LIGATION         Family History   Problem Relation Age of Onset    Diabetes Father     No Known Problems Mother     No Known Problems Sister     No Known Problems Brother     No Known Problems Paternal Grandmother     No Known Problems Sister     No Known Problems Maternal Aunt     No Known Problems Maternal Aunt     No Known Problems Maternal Aunt     No Known Problems Paternal Aunt     Breast cancer Neg Hx      I have reviewed and agree with the history as documented  E-Cigarette/Vaping    E-Cigarette Use Never User      E-Cigarette/Vaping Substances    Nicotine No     THC No     CBD No     Flavoring No     Other No     Unknown No      Social History     Tobacco Use    Smoking status: Never Smoker    Smokeless tobacco: Never Used   Vaping Use    Vaping Use: Never used   Substance Use Topics    Alcohol use: Yes     Comment: occasional    Drug use: Never       Review of Systems   Constitutional: Negative for chills, diaphoresis, fever and weight loss  HENT: Negative for ear pain, rhinorrhea and sore throat  Eyes: Negative for discharge  Respiratory: Positive for cough and wheezing  Negative for shortness of breath  Cardiovascular: Negative for chest pain  Gastrointestinal: Positive for diarrhea  Musculoskeletal: Negative for myalgias  Skin: Negative for rash  Neurological: Negative for headaches  All other systems reviewed and are negative  Physical Exam  Physical Exam  Vitals reviewed  HENT:      Head: Atraumatic  Eyes:      Conjunctiva/sclera: Conjunctivae normal    Cardiovascular:      Rate and Rhythm: Normal rate and regular rhythm  Pulmonary:      Effort: Pulmonary effort is normal       Breath sounds: Normal breath sounds  No wheezing, rhonchi or rales  Abdominal:      General: There is no distension  Musculoskeletal:         General: No deformity        Cervical back: Neck supple  Skin:     General: Skin is warm and dry  Neurological:      General: No focal deficit present  Mental Status: She is alert  Psychiatric:         Mood and Affect: Mood normal          Vital Signs  ED Triage Vitals   Temperature Pulse Respirations Blood Pressure SpO2   09/19/22 2122 09/19/22 2049 09/19/22 2049 09/19/22 2049 09/19/22 2049   98 6 °F (37 °C) 75 16 166/88 100 %      Temp Source Heart Rate Source Patient Position - Orthostatic VS BP Location FiO2 (%)   09/19/22 2122 09/19/22 2049 09/19/22 2049 09/19/22 2049 --   Oral Monitor Sitting Left arm       Pain Score       --                  Vitals:    09/19/22 2049 09/19/22 2115   BP: 166/88 150/77   Pulse: 75 79   Patient Position - Orthostatic VS: Sitting Sitting         Visual Acuity      ED Medications  Medications   albuterol (PROVENTIL HFA,VENTOLIN HFA) inhaler 2 puff (2 puffs Inhalation Given 9/19/22 2126)       Diagnostic Studies  Results Reviewed     Procedure Component Value Units Date/Time    COVID only [[id number]]  (Normal) Collected: 09/19/22 2125    Lab Status: Final result Specimen: Nares from Nasopharyngeal Swab Updated: 09/19/22 2252     SARS-CoV-2 Negative    Narrative:      FOR PEDIATRIC PATIENTS - copy/paste COVID Guidelines URL to browser: [email or web address]    SARS-CoV-2 assay is a Nucleic Acid Amplification assay intended for the  qualitative detection of nucleic acid from SARS-CoV-2 in nasopharyngeal  swabs  Results are for the presumptive identification of SARS-CoV-2 RNA  Positive results are indicative of infection with SARS-CoV-2, the virus  causing COVID-19, but do not rule out bacterial infection or co-infection  with other viruses  Laboratories within the [place] and its  territories are required to report all positive results to the appropriate  public health authorities   Negative results do not preclude SARS-CoV-2  infection and should not be used as the sole basis for treatment or other  patient management decisions  Negative results must be combined with  clinical observations, patient history, and epidemiological information  This test has not been FDA cleared or approved  This test has been authorized by FDA under an Emergency Use Authorization  (EUA)  This test is only authorized for the duration of time the  declaration that circumstances exist justifying the authorization of the  emergency use of an in vitro diagnostic tests for detection of SARS-CoV-2  virus and/or diagnosis of COVID-19 infection under section 564(b)(1) of  the Act, 21 U  S C  900SJJ-5(J)(4), unless the authorization is terminated  or revoked sooner  The test has been validated but independent review by FDA  and CLIA is pending  Test performed using [employer] GeneXpert: This RT-PCR assay targets N2,  a region unique to SARS-CoV-2  A conserved region in the E-gene was chosen  for pan-Sarbecovirus detection which includes SARS-CoV-2  According to CMS-2020-01-R, this platform meets the definition of high-throughput technology  No orders to display              Procedures  Procedures         ED Course                                             MDM    Disposition  Final diagnoses:   Cough     Time reflects when diagnosis was documented in both MDM as applicable and the Disposition within this note     Time User Action Codes Description Comment    9/19/2022  9:27 PM Lori Pyle Add [R05 9] Cough       ED Disposition     ED Disposition   Discharge    Condition   Stable    Date/Time   Mon Sep 19, 2022  9:27 PM    Comment   Blinda Bread discharge to home/self care  Follow-up Information     Follow up With Specialties Details Why Contact Info Additional Information    Amadou Tracy, [address] Nurse Practitioner Schedule an appointment as soon as possible for a visit in 3 days Follow-up and establish care with primary care    Consideration of pulmonary function testing if inhaler is beneficial  [address]  [place] [address] Emergency Department Emergency Medicine Go to  If symptoms worsen [address] Emergency Department, [address] [place] [address], [id number]          Discharge Medication List as of 9/19/2022  9:28 PM      CONTINUE these medications which have NOT CHANGED    Details   ALPRAZolam (XANAX) 0 25 mg tablet Take 1 tablet daily p r n  Anxiety, Print      HYDROcodone-acetaminophen (NORCO) 5-325 mg per tablet Take 1 tablet by mouth every 6 (six) hours as needed for pain for up to 10 dosesMax Daily Amount: 4 tablets, Starting Sun 4/19/2020, Print      ibuprofen (MOTRIN) 800 mg tablet Take 1 tablet (800 mg total) by mouth 3 (three) times a day, Starting Tue 4/12/2022, Normal      Melatonin 5 MG TABS Take by mouth, Historical Med             No discharge procedures on file      PDMP Review     None          ED Provider  Electronically Signed by           Zoë Richards MD  09/20/22 6359

## 2023-12-07 ENCOUNTER — TELEPHONE (OUTPATIENT)
Age: 48
End: 2023-12-07

## 2023-12-18 ENCOUNTER — OFFICE VISIT (OUTPATIENT)
Dept: OBGYN CLINIC | Facility: CLINIC | Age: 48
End: 2023-12-18
Payer: COMMERCIAL

## 2023-12-18 ENCOUNTER — APPOINTMENT (OUTPATIENT)
Dept: RADIOLOGY | Facility: CLINIC | Age: 48
End: 2023-12-18
Payer: COMMERCIAL

## 2023-12-18 VITALS
SYSTOLIC BLOOD PRESSURE: 138 MMHG | BODY MASS INDEX: 35.93 KG/M2 | HEIGHT: 70 IN | HEART RATE: 84 BPM | DIASTOLIC BLOOD PRESSURE: 93 MMHG | WEIGHT: 251 LBS

## 2023-12-18 DIAGNOSIS — M25.562 CHRONIC PAIN OF BOTH KNEES: ICD-10-CM

## 2023-12-18 DIAGNOSIS — G89.29 CHRONIC PAIN OF BOTH KNEES: ICD-10-CM

## 2023-12-18 DIAGNOSIS — M17.11 PRIMARY OSTEOARTHRITIS OF RIGHT KNEE: ICD-10-CM

## 2023-12-18 DIAGNOSIS — M25.561 CHRONIC PAIN OF BOTH KNEES: ICD-10-CM

## 2023-12-18 DIAGNOSIS — M17.12 PRIMARY OSTEOARTHRITIS OF LEFT KNEE: Primary | ICD-10-CM

## 2023-12-18 PROCEDURE — 99203 OFFICE O/P NEW LOW 30 MIN: CPT | Performed by: ORTHOPAEDIC SURGERY

## 2023-12-18 PROCEDURE — 73564 X-RAY EXAM KNEE 4 OR MORE: CPT

## 2023-12-18 RX ORDER — CELECOXIB 200 MG/1
CAPSULE ORAL
COMMUNITY
Start: 2023-12-11

## 2023-12-18 NOTE — PROGRESS NOTES
Patient Name:  Yin Hendricks  MRN:  [id number]    Assessment & Plan     1. Primary osteoarthritis of left knee  -     Ambulatory Referral to Physical Therapy; Future    2. Primary osteoarthritis of right knee  -     Ambulatory Referral to Physical Therapy; Future    3. Chronic pain of both knees  -     XR knee 4+ vw right injury; Future; Expected date: 12/18/2023  -     XR knee 4+ vw left injury; Future; Expected date: 12/18/2023  -     Ambulatory Referral to Physical Therapy; Future        48 y.o. female with Bilateral knee osteoarthritis.   X-rays reviewed in office today with patient.   Treatment options were discussed including corticosteroid injections, viscosupplementation, physical therapy, activity modification, oral and topical medications  Operative treatment was discussed in the form of total knee arthroplasty. Patient is not interested in operative treatment at this time.  A script was provided for physical therapy  Continue celebrex as prescribed for pain management. Advised patient to follow up with PCP if she continues to take celebrex long term.  Patient declined injection today as her pain is significantly improved.  She will follow up as needed    Chief Complaint     Bilateral knee pain    History of the Present Illness     Yin Hendricks is a 48 y.o. female with Bilateral knee pain for at least a year. She has difficulty weight bearing and ascending stairs. She broke her left ankle many years ago and favored her right leg and noticed worsening knee pain then. She recently saw Dr. Rosen from [facility] on 12/11/2023 who recommend lubricating injections and celebrex. She presents today for a second opinion. Pain is well controlled with celebrex.     Review of Systems     Review of Systems   Constitutional:  Negative for chills and fever.   HENT:  Negative for ear pain and sore throat.    Eyes:  Negative for pain and visual disturbance.   Respiratory:  Negative for cough and shortness of breath.   "  Cardiovascular:  Negative for chest pain and palpitations.   Gastrointestinal:  Negative for abdominal pain and vomiting.   Genitourinary:  Negative for dysuria and hematuria.   Musculoskeletal:  Negative for arthralgias and back pain.   Skin:  Negative for color change and rash.   Neurological:  Negative for seizures and syncope.   All other systems reviewed and are negative.      Physical Exam     /93   Pulse 84   Ht 5' 10\" (1.778 m)   Wt 114 kg (251 lb)   BMI 36.01 kg/m²     RightKnee  Range of motion from 0 to 120.    There is no crepitus with range of motion.   There is no effusion.    There is mild tenderness over the medial joint line.    There is 5/5 quadriceps strength and equal tone.    The patient is able to perform a straight leg raise.    Negative patellar grind test  Varus stress testing reveals no instability at 0 and 30 degrees   Valgus stress testing reveals no instability at 0 and 30 degrees  The patient is neurovascular intact distally.    Left Knee  Range of motion from 0 to 120.    There is no crepitus with range of motion.   There is no effusion.    There is tenderness over the medial joint line.    There is 5/5 quadriceps strength and equal tone.    The patient is able to perform a straight leg raise.      negative patellar grind test.  Varus stress testing reveals no instability at 0 and 30 degrees   Valgus stress testing reveals no instability at 0 and 30 degrees  The patient is neurovascular intact distally.    Eyes:  Anicteric sclerae.  Neck:  Supple.  Lungs:  Normal respiratory effort.  Cardiovascular:  Capillary refill is less than 2 seconds.  Skin:  Intact without erythema.  Neurologic:  Sensation grossly intact to light touch.  Psychiatric:  Mood and affect are appropriate.    Data Review     I have personally reviewed pertinent films in PACS, and my interpretation follows:    X-rays taken 12/18/2023 of Right knee independently reviewed and demonstrate moderate joint space " narrowing in medial tibial femoral joint and patellofemoral joint.     X-rays taken 2023 of Left knee independently reviewed and demonstrate moderate joint space narrowing in medial tibial femoral joint and patellofemoral joint.    History reviewed. No pertinent past medical history.    Past Surgical History:   Procedure Laterality Date    ANKLE SURGERY Left      SECTION      TUBAL LIGATION         No Known Allergies    Current Outpatient Medications on File Prior to Visit   Medication Sig Dispense Refill    celecoxib (CeleBREX) 200 mg capsule       ALPRAZolam (XANAX) 0.25 mg tablet Take 1 tablet daily p.r.n. Anxiety (Patient not taking: Reported on 2020) 10 tablet 0    HYDROcodone-acetaminophen (NORCO) 5-325 mg per tablet Take 1 tablet by mouth every 6 (six) hours as needed for pain for up to 10 dosesMax Daily Amount: 4 tablets (Patient not taking: Reported on 2021) 10 tablet 0    ibuprofen (MOTRIN) 800 mg tablet Take 1 tablet (800 mg total) by mouth 3 (three) times a day (Patient not taking: Reported on 2023) 21 tablet 0    Melatonin 5 MG TABS Take by mouth (Patient not taking: Reported on 2023)       No current facility-administered medications on file prior to visit.       Social History     Tobacco Use    Smoking status: Never    Smokeless tobacco: Never   Vaping Use    Vaping status: Never Used   Substance Use Topics    Alcohol use: Yes     Comment: occasional    Drug use: Never       Family History   Problem Relation Age of Onset    Diabetes Father     No Known Problems Mother     No Known Problems Sister     No Known Problems Brother     No Known Problems Paternal Grandmother     No Known Problems Sister     No Known Problems Maternal Aunt     No Known Problems Maternal Aunt     No Known Problems Maternal Aunt     No Known Problems Paternal Aunt     Breast cancer Neg Hx              Procedures Performed     Procedures  None performed.      DO Brandon Glynn  Attestation      I,:  Malka Wan am acting as a scribe while in the presence of the attending physician.:       I,:  Jian Bailey, DO personally performed the services described in this documentation    as scribed in my presence.:

## 2024-01-09 ENCOUNTER — EVALUATION (OUTPATIENT)
Dept: PHYSICAL THERAPY | Facility: CLINIC | Age: 49
End: 2024-01-09
Payer: COMMERCIAL

## 2024-01-09 DIAGNOSIS — M25.561 CHRONIC PAIN OF BOTH KNEES: ICD-10-CM

## 2024-01-09 DIAGNOSIS — M17.12 PRIMARY OSTEOARTHRITIS OF LEFT KNEE: Primary | ICD-10-CM

## 2024-01-09 DIAGNOSIS — M17.11 PRIMARY OSTEOARTHRITIS OF RIGHT KNEE: ICD-10-CM

## 2024-01-09 DIAGNOSIS — M25.562 CHRONIC PAIN OF BOTH KNEES: ICD-10-CM

## 2024-01-09 DIAGNOSIS — G89.29 CHRONIC PAIN OF BOTH KNEES: ICD-10-CM

## 2024-01-09 PROCEDURE — 97110 THERAPEUTIC EXERCISES: CPT

## 2024-01-09 PROCEDURE — 97161 PT EVAL LOW COMPLEX 20 MIN: CPT

## 2024-01-09 NOTE — PROGRESS NOTES
PT Evaluation     Today's date: 2024  Patient name: Yin Hendricks  : 1975  MRN: [id number]  Referring provider: Jian Bailey DO  Dx:   Encounter Diagnosis     ICD-10-CM    1. Primary osteoarthritis of left knee  M17.12 Ambulatory Referral to Physical Therapy      2. Chronic pain of both knees  M25.561 Ambulatory Referral to Physical Therapy    M25.562     G89.29       3. Primary osteoarthritis of right knee  M17.11 Ambulatory Referral to Physical Therapy          Start Time: 1530  Stop Time: 1619  Total time in clinic (min): 49 minutes    Assessment  Assessment details: Problem List:  1) L hip ABD weakness  2) R hip ABD weakness    Yin Hendricks is a pleasant 48 y.o. female who presents with chronic B knee pain.  She has B hip weakness, general deconditioning, abnormal gait, and activity intolerance resulting in the pain she is experiencing.  No further referral appears necessary at this time based upon examination results.  I expect she will improve with progressive strengthening exercises.  Positive prognostic indicators include positive attitude toward recovery.  Negative prognostic indicators include chronicity of symptoms, high symptom irritability, and obesity.  Gave patient clamshells, bridges, and SL hip ABD for HEP.  Patient would benefit from skilled PT services to address these deficits and return to PLOF.    Comparable signs:  1) L hip ABD MMT  2) R hip ABD MMT  Impairments: abnormal gait, activity intolerance, impaired physical strength, lacks appropriate home exercise program, pain with function and weight-bearing intolerance    Symptom irritability: highUnderstanding of Dx/Px/POC: good   Prognosis: good    Goals  STGs  Patient will be independent with HEP in 4 weeks  Patient will decrease pain by 2 points in 4 weeks  LTGs  Patient will increase L hip ABD MMT by 1 point in 8 weeks  Patient will increase R hip ABD MMT by 1 point in 8 weeks  Patient will achieve predicted FOTO score by  d/c in 8 weeks    Plan  Patient would benefit from: skilled physical therapy  Planned therapy interventions: home exercise program, therapeutic exercise, therapeutic activities, motor coordination training, neuromuscular re-education, patient education, strengthening and balance  Frequency: 2x week  Duration in visits: 16  Duration in weeks: 8  Plan of Care beginning date: 2024  Plan of Care expiration date: 3/5/2024  Treatment plan discussed with: patient        Subjective Evaluation    History of Present Illness  Mechanism of injury: Pain location: B anterior knee joint and patella  Pain severity: 0-0-10  Aggs: negotiating stairs, in and out of car, standing from chair, walking  Eases: heat  Irritability: high  MAREN/timeline: patient has history of L ankle fracture about 20 years ago as a result she was using the RLE more, about 6 months ago patient moved into a townhouse and noticed B knee pain when negotiating stairs, stiffness after sitting for prolonged periods  Red flags: none  PMH/PSH: L ankle fracture    Patient Goals  Patient goal: walk without pain, don't baby knee  Pain  Current pain ratin  At best pain ratin  At worst pain rating: 10          Objective     Tenderness     Additional Tenderness Details  B medial joint line    Neurological Testing     Sensation     Lumbar   Left   Intact: light touch    Right   Intact: light touch    Reflexes   Left   Patellar (L4): trace (1+)    Right   Patellar (L4): trace (1+)    Active Range of Motion   Left Knee   Flexion: WFL  Extension: WFL    Right Knee   Flexion: WFL  Extension: WFL    Strength/Myotome Testing     Left Hip   Planes of Motion   Flexion: 4+  Extension: 4  Abduction: 4    Right Hip   Planes of Motion   Flexion: 4+  Extension: 4  Abduction: 4    Left Knee   Flexion: 5  Extension: 5    Right Knee   Flexion: 5  Extension: 5    Left Ankle/Foot   Dorsiflexion: 5    Right Ankle/Foot   Dorsiflexion: 5    Ambulation     Ambulation: Level Surfaces    Ambulation without assistive device: independent    Additional Level Surfaces Ambulation Details  Increased ML sway             Precautions: none  POC expires Unit limit Auth Expiration date PT/OT/ST + Visit Limit?   3/5/24 3 units pend 45 pcy                           Visit/Unit Tracking  AUTH Status:  Date 1/9              pend Used 1               Remaining                    Foto 1/9            Manuals 1/9                                                                Neuro Re-Ed             Tandem stance             Tandem walk             Hip ABD iso                                                                 Ther Ex             SL hip ABD HEP            Bridges HEP            Clamshell HEP            X walk             Leg press                          Pt edu GS- pathology, HEP            Bike             Ther Activity             STS             Lateral step up and over             Gait Training                                       Modalities

## 2024-01-15 ENCOUNTER — APPOINTMENT (OUTPATIENT)
Dept: PHYSICAL THERAPY | Facility: CLINIC | Age: 49
End: 2024-01-15
Payer: COMMERCIAL

## 2024-01-19 ENCOUNTER — APPOINTMENT (OUTPATIENT)
Dept: PHYSICAL THERAPY | Facility: CLINIC | Age: 49
End: 2024-01-19
Payer: COMMERCIAL

## 2024-01-23 ENCOUNTER — APPOINTMENT (OUTPATIENT)
Dept: PHYSICAL THERAPY | Facility: CLINIC | Age: 49
End: 2024-01-23
Payer: COMMERCIAL

## 2024-01-26 ENCOUNTER — APPOINTMENT (OUTPATIENT)
Dept: PHYSICAL THERAPY | Facility: CLINIC | Age: 49
End: 2024-01-26
Payer: COMMERCIAL

## 2024-01-29 ENCOUNTER — APPOINTMENT (OUTPATIENT)
Dept: PHYSICAL THERAPY | Facility: CLINIC | Age: 49
End: 2024-01-29
Payer: COMMERCIAL

## 2024-01-30 ENCOUNTER — APPOINTMENT (OUTPATIENT)
Dept: PHYSICAL THERAPY | Facility: CLINIC | Age: 49
End: 2024-01-30
Payer: COMMERCIAL

## 2024-08-26 ENCOUNTER — OFFICE VISIT (OUTPATIENT)
Dept: OBGYN CLINIC | Facility: CLINIC | Age: 49
End: 2024-08-26
Payer: COMMERCIAL

## 2024-08-26 VITALS
WEIGHT: 236.6 LBS | DIASTOLIC BLOOD PRESSURE: 85 MMHG | SYSTOLIC BLOOD PRESSURE: 126 MMHG | HEIGHT: 70 IN | HEART RATE: 72 BPM | BODY MASS INDEX: 33.87 KG/M2

## 2024-08-26 DIAGNOSIS — M25.562 CHRONIC PAIN OF BOTH KNEES: ICD-10-CM

## 2024-08-26 DIAGNOSIS — M25.561 CHRONIC PAIN OF BOTH KNEES: ICD-10-CM

## 2024-08-26 DIAGNOSIS — G89.29 CHRONIC PAIN OF BOTH KNEES: ICD-10-CM

## 2024-08-26 DIAGNOSIS — M17.11 PRIMARY OSTEOARTHRITIS OF RIGHT KNEE: Primary | ICD-10-CM

## 2024-08-26 DIAGNOSIS — M17.12 PRIMARY OSTEOARTHRITIS OF LEFT KNEE: ICD-10-CM

## 2024-08-26 PROCEDURE — 99213 OFFICE O/P EST LOW 20 MIN: CPT | Performed by: ORTHOPAEDIC SURGERY

## 2024-08-26 NOTE — PROGRESS NOTES
"Patient Name:  Yin Hendricks  MRN:  [id number]    Assessment & Plan     1. Primary osteoarthritis of right knee  2. Primary osteoarthritis of left knee  3. Chronic pain of both knees      Bilateral knee osteoarthritis  X-rays briefly discussed with patient   Discussed nonoperative vs surgical management of knee osteoarthritis with patient  Nonoperative management recommended secondary to recent improvement with visco injections. Discussed OTC topical and oral analgesics, CSI vs visco injections, outpatient PT and bracing.   Surgical management by means of total knee arthroplasty. At this time recommended continued nonoperative management.  If patient's pain worsens, can consider CSI vs repeat visco injections once available. Call the office if she wishes to proceed and will place order at that time.  Follow up as needed      History of the Present Illness   Yin Hendricks is a 49 y.o. female with Bilateral knee osteoarthritis. Today, patient reports he had injections with Dr Rosen in January and at the end of July had follow up appointment, but does not want to see him anymore. Patient admits her knees are improved from the gel injections, but wants to talk about options for her knees. She denies previous CSI.         Review of Systems     Review of Systems   Constitutional:  Negative for chills and fever.   HENT:  Negative for congestion.    Respiratory:  Negative for cough, chest tightness and shortness of breath.    Cardiovascular:  Negative for chest pain and palpitations.   Gastrointestinal:  Negative for abdominal pain.   Endocrine: Negative for cold intolerance and heat intolerance.   Neurological:  Negative for syncope.   Psychiatric/Behavioral:  Negative for confusion.        Physical Exam     /85   Pulse 72   Ht 5' 10\" (1.778 m)   Wt 107 kg (236 lb 9.6 oz)   BMI 33.95 kg/m²     Right Knee  Range of motion from 0 to 125 degrees.    There is no effusion.    There is no tenderness over the knee.  "   The patient is able to perform a straight leg raise with 5/5 quad strength.    Varus stress testing reveals no pain or instability at 0 and 30 degrees   Valgus stress testing reveals no pain or instability at 0 and 30 degrees  The patient is neurovascular intact distally.    Left Knee  Range of motion from 0 to 125 degrees.    There is no effusion.    There is no tenderness over the knee.    The patient is able to perform a straight leg raise with 5/5 quad strength.    Varus stress testing reveals no pain or instability at 0 and 30 degrees   Valgus stress testing reveals no pain or instability at 0 and 30 degrees  The patient is neurovascular intact distally.    Data Review     I have personally reviewed pertinent films in PACS, and my interpretation follows.    X-rays taken 12/18/2023 of Right knee demonstrate moderate joint space narrowing in medial tibial femoral joint and patellofemoral joint.      X-rays taken 12/18/2023 of Left knee demonstrate moderate joint space narrowing in medial tibial femoral joint and patellofemoral joint.    Social History     Tobacco Use    Smoking status: Never    Smokeless tobacco: Never   Vaping Use    Vaping status: Never Used   Substance Use Topics    Alcohol use: Yes     Comment: occasional    Drug use: Never           Procedures  None     Gaby Tobar PA-C

## 2024-12-16 ENCOUNTER — TELEPHONE (OUTPATIENT)
Dept: OBGYN CLINIC | Facility: CLINIC | Age: 49
End: 2024-12-16

## 2024-12-16 NOTE — TELEPHONE ENCOUNTER
Patient had stopped in asking for a Temporary Parking Placard.  Left voicemail that Dr. Bailey only signs for post operative patients and he has not seen her in four months.

## 2025-01-31 ENCOUNTER — OFFICE VISIT (OUTPATIENT)
Dept: OBGYN CLINIC | Facility: CLINIC | Age: 50
End: 2025-01-31
Payer: COMMERCIAL

## 2025-01-31 VITALS — WEIGHT: 237 LBS | BODY MASS INDEX: 33.93 KG/M2 | RESPIRATION RATE: 18 BRPM | HEIGHT: 70 IN

## 2025-01-31 DIAGNOSIS — M17.0 BILATERAL PRIMARY OSTEOARTHRITIS OF KNEE: Primary | ICD-10-CM

## 2025-01-31 PROCEDURE — 99213 OFFICE O/P EST LOW 20 MIN: CPT | Performed by: ORTHOPAEDIC SURGERY

## 2025-01-31 NOTE — PROGRESS NOTES
Patient Name:  Yin Hendricks  MRN:  [id number]    Assessment & Plan     1. Bilateral primary osteoarthritis of knee  -     Injection Procedure Prior Authorization; Future      Bilateral knee osteoarthritis  Patient to be full weightbearing to BLE (Bilateral Lower Extremity)  ROM as tolerated to  BLE (Bilateral Lower Extremity)  Discussed treatment options moving forward including over the counter analgesics and topical creams, gentle range of motion, physical therapy, bracing, corticosteroid injections, viscosupplementation, total knee arthoplasty   Preauthorization was requested for repeat viscosupplementaiton as she had previous benefit from Synvisc-one for approximately 1 year.  Patient to continue at home analgesic regimen with topical creams and Tylenol as needed   Patient to follow up upon approval of Synvisc-one.         History of the Present Illness   Yin Hendricks is a 49 y.o. female with Bilateral knee osteoarthritis status post Synvisc One injections with Dr. Jessika GEORGE on 1/15/24. The patient has an increase in discomfort more so over the past week. She feels as though her knees are rubbing. She does not feel as steady when she rises from a seated position. She is very cautious when she stands from a seated position. She does make her self walk around more to prevent stiffness. She does feel as though she is tight with extension. She does note difficulty with stairs. She uses IcyHot or Vicks as needed for symptoms.         Review of Systems     Review of Systems   Constitutional:  Negative for chills and fever.   HENT:  Negative for congestion.    Respiratory:  Negative for cough, chest tightness and shortness of breath.    Cardiovascular:  Negative for chest pain and palpitations.   Gastrointestinal:  Negative for abdominal pain.   Endocrine: Negative for cold intolerance and heat intolerance.   Musculoskeletal:  Positive for arthralgias (bilateral knees).   Neurological:  Negative for syncope.  "  Psychiatric/Behavioral:  Negative for confusion.        Physical Exam     Resp 18   Ht 5' 10\" (1.778 m)   Wt 108 kg (237 lb)   BMI 34.01 kg/m²     Right Knee    Range of motion from 0 to 120 degrees.    There is no effusion.    There is no tenderness over the knee.    The patient is able to perform a straight leg raise with 5/5 quad strength.    Varus stress testing reveals no pain or instability at 0 and 30 degrees   Valgus stress testing reveals no pain or instability at 0 and 30 degrees  The patient is neurovascular intact distally.    Left Knee  Range of motion from 0 to 12 degrees.    There is trace effusion.    There is tenderness over the medial femoral condyle   The patient is able to perform a straight leg raise with 5/5 quad strength.    Varus stress testing reveals no pain or instability at 0 and 30 degrees   Valgus stress testing reveals no pain or instability at 0 and 30 degrees  The patient is neurovascular intact distally.    Data Review     I have personally reviewed pertinent films in PACS, and my interpretation follows.    X-rays taken 12/18/2023 of Right knee demonstrate moderate joint space narrowing in medial tibial femoral joint and patellofemoral joint.      X-rays taken 12/18/2023 of Left knee demonstrate moderate joint space narrowing in medial tibial femoral joint and patellofemoral joint.    Social History     Tobacco Use    Smoking status: Never    Smokeless tobacco: Never   Vaping Use    Vaping status: Never Used   Substance Use Topics    Alcohol use: Yes     Comment: occasional    Drug use: Never           Procedures  None performed.     Jian Bailey DO   Scribe Attestation      I,:  Ginette Herrera am acting as a scribe while in the presence of the attending physician.:       I,:  Jian Bailey DO personally performed the services described in this documentation    as scribed in my presence.:             "

## 2025-03-10 ENCOUNTER — PROCEDURE VISIT (OUTPATIENT)
Dept: OBGYN CLINIC | Facility: CLINIC | Age: 50
End: 2025-03-10
Payer: COMMERCIAL

## 2025-03-10 VITALS — WEIGHT: 235 LBS | BODY MASS INDEX: 33.64 KG/M2 | HEIGHT: 70 IN | RESPIRATION RATE: 18 BRPM

## 2025-03-10 DIAGNOSIS — M17.0 BILATERAL PRIMARY OSTEOARTHRITIS OF KNEE: Primary | ICD-10-CM

## 2025-03-10 PROCEDURE — 20610 DRAIN/INJ JOINT/BURSA W/O US: CPT | Performed by: PHYSICIAN ASSISTANT

## 2025-03-10 NOTE — PROGRESS NOTES
Patient has failed at least three months of conservative therapy including OTC medications , patient symptoms include pain with increased function, pain is rated at 6/10.  After discussing the options for treatment, the patient elected to proceed forward with Bilateral knee Synvisc One injections to reduce pain. Risks of injection, including but not limited to, post-injection pain, swelling, pseudo septic reaction, skin rash, itching, and infection were discussed in detail.  The patient understood, had no further questions and elected to proceed forward.  After sterile preparation, the Synvisc One injection were injected into the Bilateral knee.  The patient tolerated the procedure well no complications were noted.  The patient was instructed ice and elevate the extremity, limit strenuous activity for the next 2-3 days, and to contact us if there were any questions or concerns prior to their follow-up appointment.  I will see the patient back as needed.        Large joint arthrocentesis: R knee  Universal Protocol:  Risks and benefits: risks, benefits and alternatives were discussed  Consent given by: patient  Patient identity confirmed: verbally with patient  Supporting Documentation  Indications: pain   Procedure Details  Location: knee - R knee  Needle size: 22 G  Approach: lateral  Medications administered: 48 mg hylan 48 MG/6ML    Patient tolerance: patient tolerated the procedure well with no immediate complications  Dressing:  Sterile dressing applied      Large joint arthrocentesis: L knee  Universal Protocol:  Risks and benefits: risks, benefits and alternatives were discussed  Consent given by: patient  Supporting Documentation  Indications: pain   Procedure Details  Location: knee - L knee  Needle size: 22 G  Approach: lateral  Medications administered: 48 mg hylan 48 MG/6ML    Patient tolerance: patient tolerated the procedure well with no immediate complications  Dressing:  Sterile dressing  applied